# Patient Record
Sex: FEMALE | Race: WHITE | NOT HISPANIC OR LATINO | Employment: FULL TIME | URBAN - METROPOLITAN AREA
[De-identification: names, ages, dates, MRNs, and addresses within clinical notes are randomized per-mention and may not be internally consistent; named-entity substitution may affect disease eponyms.]

---

## 2023-07-26 ENCOUNTER — APPOINTMENT (OUTPATIENT)
Dept: PRIMARY CARE | Facility: CLINIC | Age: 23
End: 2023-07-26

## 2025-02-12 ENCOUNTER — HOSPITAL ENCOUNTER (EMERGENCY)
Facility: HOSPITAL | Age: 25
Discharge: HOME | End: 2025-02-12
Payer: COMMERCIAL

## 2025-02-12 VITALS
BODY MASS INDEX: 18.33 KG/M2 | DIASTOLIC BLOOD PRESSURE: 77 MMHG | OXYGEN SATURATION: 100 % | RESPIRATION RATE: 16 BRPM | HEIGHT: 65 IN | SYSTOLIC BLOOD PRESSURE: 124 MMHG | TEMPERATURE: 98.8 F | WEIGHT: 110 LBS | HEART RATE: 89 BPM

## 2025-02-12 DIAGNOSIS — S61.219A FINGER LACERATION, INITIAL ENCOUNTER: Primary | ICD-10-CM

## 2025-02-12 PROCEDURE — 99282 EMERGENCY DEPT VISIT SF MDM: CPT | Mod: 25

## 2025-02-12 PROCEDURE — 12001 RPR S/N/AX/GEN/TRNK 2.5CM/<: CPT

## 2025-02-12 PROCEDURE — 90471 IMMUNIZATION ADMIN: CPT | Performed by: NURSE PRACTITIONER

## 2025-02-12 PROCEDURE — 2500000004 HC RX 250 GENERAL PHARMACY W/ HCPCS (ALT 636 FOR OP/ED): Performed by: NURSE PRACTITIONER

## 2025-02-12 PROCEDURE — 90715 TDAP VACCINE 7 YRS/> IM: CPT | Performed by: NURSE PRACTITIONER

## 2025-02-12 RX ADMIN — TETANUS TOXOID, REDUCED DIPHTHERIA TOXOID AND ACELLULAR PERTUSSIS VACCINE, ADSORBED 0.5 ML: 5; 2.5; 8; 8; 2.5 SUSPENSION INTRAMUSCULAR at 20:44

## 2025-02-12 ASSESSMENT — PAIN DESCRIPTION - ORIENTATION: ORIENTATION: RIGHT;MID

## 2025-02-12 ASSESSMENT — PAIN - FUNCTIONAL ASSESSMENT: PAIN_FUNCTIONAL_ASSESSMENT: 0-10

## 2025-02-12 ASSESSMENT — PAIN DESCRIPTION - LOCATION: LOCATION: FINGER (COMMENT WHICH ONE)

## 2025-02-12 ASSESSMENT — PAIN DESCRIPTION - PAIN TYPE: TYPE: ACUTE PAIN

## 2025-02-12 ASSESSMENT — COLUMBIA-SUICIDE SEVERITY RATING SCALE - C-SSRS
2. HAVE YOU ACTUALLY HAD ANY THOUGHTS OF KILLING YOURSELF?: NO
1. IN THE PAST MONTH, HAVE YOU WISHED YOU WERE DEAD OR WISHED YOU COULD GO TO SLEEP AND NOT WAKE UP?: NO
6. HAVE YOU EVER DONE ANYTHING, STARTED TO DO ANYTHING, OR PREPARED TO DO ANYTHING TO END YOUR LIFE?: NO

## 2025-02-12 ASSESSMENT — PAIN SCALES - GENERAL: PAINLEVEL_OUTOF10: 6

## 2025-02-12 NOTE — ED NOTES
Pt states that she was using a Mandelin and cut her right middle finger. Pt has skin flap still attached. Bleeding control in triage. Pt believes she is up to date on tetanus shot.     Farzaneh Rice RN  02/12/25 1596

## 2025-02-13 PROBLEM — J45.20 MILD INTERMITTENT ASTHMA WITHOUT COMPLICATION (HHS-HCC): Status: ACTIVE | Noted: 2025-02-13

## 2025-02-13 PROBLEM — K29.70 GASTRITIS: Status: ACTIVE | Noted: 2018-09-28

## 2025-02-13 PROBLEM — D64.9 ANEMIA: Status: ACTIVE | Noted: 2019-05-10

## 2025-02-13 PROBLEM — R11.0 NAUSEA: Status: ACTIVE | Noted: 2018-07-24

## 2025-02-13 PROBLEM — F41.9 ANXIETY DISORDER: Status: ACTIVE | Noted: 2017-12-19

## 2025-02-13 PROBLEM — K21.9 GASTROESOPHAGEAL REFLUX DISEASE: Status: ACTIVE | Noted: 2018-07-24

## 2025-02-13 PROBLEM — R10.84 GENERALIZED ABDOMINAL PAIN: Status: ACTIVE | Noted: 2018-07-24

## 2025-02-13 PROBLEM — F34.1 DYSTHYMIA: Status: ACTIVE | Noted: 2017-12-01

## 2025-02-13 PROBLEM — R14.0 ABDOMINAL BLOATING: Status: ACTIVE | Noted: 2025-02-13

## 2025-02-13 NOTE — ED PROVIDER NOTES
HPI   Chief Complaint   Patient presents with    Laceration     Pt states that she was using a Mandelin and cut her right middle finger. Pt has skin flap still attached. Bleeding control in triage. Pt believes she is up to date on tetanus shot.       Patient is a uncomfortable 24-year-old female with a past medical history of anxiety, anemia, dysthymia, gastritis, soft reflux, mild intermittent asthma, Lyme D deficiency, presents the emergency today for complaint of right middle finger laceration.  Patient states she was slicing vegetables using a mandolin when she accidentally cut the middle finger on the right hand.  Patient states she could not get the bleeding to stop and came the emergency room for further evaluation.  Patient states she is not sure when her last tetanus immunization was.  Patient denies any radiation of pain into the base the finger, palm or wrist.  Patient denies any lightheadedness or dizziness, syncopal near syncopal events, chest pain, shortness breath or difficulty breathing, abdominal pain, nausea, vomit, diarrhea or constipation, fever, shaking, or chills.              Patient History   Past Medical History:   Diagnosis Date    Anemia, unspecified     Mild anemia    Influenza due to other identified influenza virus with other respiratory manifestations 02/03/2017    Influenza B    Personal history of other diseases of the female genital tract 03/29/2019    History of ovarian cyst    Personal history of other drug therapy     History of varicella vaccination    Unspecified injury of lower back, initial encounter 12/02/2015    Tailbone injury     Past Surgical History:   Procedure Laterality Date    OTHER SURGICAL HISTORY  03/29/2019    Endoscopy     No family history on file.  Social History     Tobacco Use    Smoking status: Not on file    Smokeless tobacco: Not on file   Substance Use Topics    Alcohol use: Not on file    Drug use: Not on file       Physical Exam   ED Triage Vitals  [02/12/25 1705]   Temperature Heart Rate Respirations BP   37.1 °C (98.8 °F) 89 16 124/77      Pulse Ox Temp Source Heart Rate Source Patient Position   100 % Temporal Monitor Sitting      BP Location FiO2 (%)     Right arm --       Physical Exam  Vitals and nursing note reviewed.   Constitutional:       General: She is not in acute distress.     Appearance: Normal appearance. She is not ill-appearing, toxic-appearing or diaphoretic.   HENT:      Head: Normocephalic.   Eyes:      General:         Right eye: No discharge.         Left eye: No discharge.      Extraocular Movements: Extraocular movements intact.      Pupils: Pupils are equal, round, and reactive to light.   Cardiovascular:      Rate and Rhythm: Normal rate and regular rhythm.      Pulses: Normal pulses.      Heart sounds: Normal heart sounds. No murmur heard.     No friction rub. No gallop.   Pulmonary:      Effort: Pulmonary effort is normal. No respiratory distress.      Breath sounds: Normal breath sounds. No stridor. No wheezing, rhonchi or rales.   Chest:      Chest wall: No tenderness.   Musculoskeletal:         General: Tenderness present. No swelling, deformity or signs of injury. Normal range of motion.      Cervical back: Normal range of motion and neck supple.      Right lower leg: No edema.      Left lower leg: No edema.      Comments: Small half centimeter crescent avulsion laceration present to the distal tip of the middle finger on the right hand on the palmar aspect, there is no nail involvement, no nailbed involvement, able to flex extend all digits on any difficulty, cap refill less than 3 seconds, radial pulses strong and regular.   Skin:     General: Skin is warm.      Capillary Refill: Capillary refill takes less than 2 seconds.      Coloration: Skin is not jaundiced or pale.      Findings: No bruising, erythema, lesion or rash.   Neurological:      General: No focal deficit present.      Mental Status: She is alert and oriented to  person, place, and time.           ED Course & MDM   Diagnoses as of 02/13/25 0250   Finger laceration, initial encounter                 No data recorded     Ariel Coma Scale Score: 15 (02/12/25 1706 : Farzaneh Rice RN)                           Medical Decision Making  Given patient's complaint and presentation a thorough exam was performed.  On exam patient has Small half centimeter crescent avulsion laceration present to the distal tip of the middle finger on the right hand on the palmar aspect, there is no nail involvement, no nailbed involvement, able to flex extend all digits on any difficulty, cap refill less than 3 seconds, radial pulses strong and regular.  Remains hemodynamically stable during emergency evaluation, is afebrile, have a low suspicion for vascular compromise, flexor extensor tendon injury, underlying osseous abnormality.  Risk and benefits were discussed with patient regards to wound repair.  Patient has full mental capacity, understands material and agreed to have wound repair performed.  Please refer to procedure note.  Tetanus immunization was updated today.  Sterile dressing was applied and I encouraged monitoring symptoms, if they become worse return to emergency room for further evaluation, otherwise follow-up with primary care provider in the next several weeks.  Patient was agreeable this plan and discharged home in stable condition.        Procedure  Laceration Repair    Performed by: SENDY Rivera  Authorized by: SENDY Rivera    Consent:     Consent obtained:  Verbal    Consent given by:  Patient    Risks, benefits, and alternatives were discussed: yes      Risks discussed:  Infection, need for additional repair, nerve damage, poor wound healing, poor cosmetic result, pain, retained foreign body, tendon damage and vascular damage    Alternatives discussed:  Referral, observation, delayed treatment and no treatment  Universal protocol:     Procedure explained  and questions answered to patient or proxy's satisfaction: yes      Relevant documents present and verified: yes      Test results available: yes      Imaging studies available: yes      Required blood products, implants, devices, and special equipment available: yes      Site/side marked: yes      Immediately prior to procedure, a time out was called: yes      Patient identity confirmed:  Verbally with patient and arm band  Anesthesia:     Anesthesia method:  None  Laceration details:     Location:  Finger    Finger location:  R long finger    Length (cm):  0.5    Depth (mm):  0.5  Pre-procedure details:     Preparation:  Patient was prepped and draped in usual sterile fashion  Exploration:     Limited defect created (wound extended): no      Imaging outcome: foreign body not noted      Wound exploration: wound explored through full range of motion and entire depth of wound visualized      Wound extent: no fascia violation noted, no foreign bodies/material noted, no muscle damage noted, no nerve damage noted, no tendon damage noted, no underlying fracture noted and no vascular damage noted      Contaminated: no    Treatment:     Area cleansed with:  Soap and water    Amount of cleaning:  Standard    Debridement:  None    Undermining:  None    Scar revision: no    Skin repair:     Repair method:  Steri-Strips    Number of Steri-Strips:  3  Approximation:     Approximation:  Close  Repair type:     Repair type:  Simple  Post-procedure details:     Dressing:  Non-adherent dressing    Procedure completion:  Tolerated       GENNY Rivera-MINNIE  02/13/25 0250